# Patient Record
Sex: FEMALE | Race: BLACK OR AFRICAN AMERICAN | NOT HISPANIC OR LATINO
[De-identification: names, ages, dates, MRNs, and addresses within clinical notes are randomized per-mention and may not be internally consistent; named-entity substitution may affect disease eponyms.]

---

## 2021-01-01 ENCOUNTER — RESULT REVIEW (OUTPATIENT)
Age: 56
End: 2021-01-01

## 2021-05-20 ENCOUNTER — APPOINTMENT (OUTPATIENT)
Dept: SURGICAL ONCOLOGY | Facility: CLINIC | Age: 56
End: 2021-05-20
Payer: COMMERCIAL

## 2021-05-20 PROCEDURE — 19083 BX BREAST 1ST LESION US IMAG: CPT

## 2021-05-20 PROCEDURE — 99205 OFFICE O/P NEW HI 60 MIN: CPT | Mod: 25

## 2021-05-26 ENCOUNTER — NON-APPOINTMENT (OUTPATIENT)
Age: 56
End: 2021-05-26

## 2021-07-07 ENCOUNTER — NON-APPOINTMENT (OUTPATIENT)
Age: 56
End: 2021-07-07

## 2021-07-15 ENCOUNTER — APPOINTMENT (OUTPATIENT)
Dept: SURGICAL ONCOLOGY | Facility: CLINIC | Age: 56
End: 2021-07-15
Payer: COMMERCIAL

## 2021-07-15 PROCEDURE — 99215 OFFICE O/P EST HI 40 MIN: CPT

## 2021-07-27 ENCOUNTER — APPOINTMENT (OUTPATIENT)
Dept: SURGICAL ONCOLOGY | Facility: AMBULATORY MEDICAL SERVICES | Age: 56
End: 2021-07-27
Payer: COMMERCIAL

## 2021-07-27 PROCEDURE — 38792 RA TRACER ID OF SENTINL NODE: CPT | Mod: RT,59

## 2021-07-27 PROCEDURE — 19307 MAST MOD RAD: CPT | Mod: RT

## 2021-07-27 PROCEDURE — 38308 INCISION OF LYMPH CHANNELS: CPT | Mod: RT

## 2021-08-05 ENCOUNTER — APPOINTMENT (OUTPATIENT)
Dept: SURGICAL ONCOLOGY | Facility: CLINIC | Age: 56
End: 2021-08-05
Payer: COMMERCIAL

## 2021-08-05 PROCEDURE — 99024 POSTOP FOLLOW-UP VISIT: CPT

## 2021-11-11 ENCOUNTER — APPOINTMENT (OUTPATIENT)
Dept: SURGICAL ONCOLOGY | Facility: CLINIC | Age: 56
End: 2021-11-11
Payer: COMMERCIAL

## 2021-11-11 PROCEDURE — 99214 OFFICE O/P EST MOD 30 MIN: CPT

## 2022-02-17 ENCOUNTER — APPOINTMENT (OUTPATIENT)
Dept: SURGICAL ONCOLOGY | Facility: CLINIC | Age: 57
End: 2022-02-17
Payer: COMMERCIAL

## 2022-02-17 PROCEDURE — 99215 OFFICE O/P EST HI 40 MIN: CPT

## 2022-04-28 ENCOUNTER — APPOINTMENT (OUTPATIENT)
Dept: SURGICAL ONCOLOGY | Facility: CLINIC | Age: 57
End: 2022-04-28
Payer: MEDICAID

## 2022-04-28 PROCEDURE — 99215 OFFICE O/P EST HI 40 MIN: CPT

## 2022-07-28 ENCOUNTER — APPOINTMENT (OUTPATIENT)
Dept: SURGICAL ONCOLOGY | Facility: CLINIC | Age: 57
End: 2022-07-28

## 2023-03-30 ENCOUNTER — APPOINTMENT (OUTPATIENT)
Dept: SURGICAL ONCOLOGY | Facility: CLINIC | Age: 58
End: 2023-03-30
Payer: MEDICAID

## 2023-03-30 PROCEDURE — 99214 OFFICE O/P EST MOD 30 MIN: CPT

## 2023-10-05 ENCOUNTER — APPOINTMENT (OUTPATIENT)
Dept: SURGICAL ONCOLOGY | Facility: CLINIC | Age: 58
End: 2023-10-05
Payer: MEDICAID

## 2023-10-05 PROCEDURE — 99215 OFFICE O/P EST HI 40 MIN: CPT

## 2024-05-02 ENCOUNTER — RESULT REVIEW (OUTPATIENT)
Age: 59
End: 2024-05-02

## 2024-05-02 ENCOUNTER — APPOINTMENT (OUTPATIENT)
Dept: SURGICAL ONCOLOGY | Facility: CLINIC | Age: 59
End: 2024-05-02

## 2024-05-02 PROCEDURE — 99215 OFFICE O/P EST HI 40 MIN: CPT | Mod: 25

## 2024-05-02 PROCEDURE — 10005 FNA BX W/US GDN 1ST LES: CPT | Mod: RT

## 2024-05-08 ENCOUNTER — NON-APPOINTMENT (OUTPATIENT)
Age: 59
End: 2024-05-08

## 2024-05-08 DIAGNOSIS — D05.10 INTRADUCTAL CARCINOMA IN SITU OF UNSPECIFIED BREAST: ICD-10-CM

## 2024-07-11 ENCOUNTER — APPOINTMENT (OUTPATIENT)
Dept: SURGICAL ONCOLOGY | Facility: CLINIC | Age: 59
End: 2024-07-11

## 2024-07-11 PROCEDURE — 99215 OFFICE O/P EST HI 40 MIN: CPT

## 2024-07-12 DIAGNOSIS — C50.211 MALIGNANT NEOPLASM OF UPPER-INNER QUADRANT OF RIGHT FEMALE BREAST: ICD-10-CM

## 2024-07-12 DIAGNOSIS — Z17.0 MALIGNANT NEOPLASM OF UPPER-INNER QUADRANT OF RIGHT FEMALE BREAST: ICD-10-CM

## 2024-08-19 ENCOUNTER — APPOINTMENT (OUTPATIENT)
Dept: PLASTIC SURGERY | Facility: CLINIC | Age: 59
End: 2024-08-19
Payer: COMMERCIAL

## 2024-08-19 VITALS
SYSTOLIC BLOOD PRESSURE: 168 MMHG | HEART RATE: 91 BPM | OXYGEN SATURATION: 99 % | WEIGHT: 154 LBS | BODY MASS INDEX: 24.75 KG/M2 | DIASTOLIC BLOOD PRESSURE: 91 MMHG | TEMPERATURE: 97.9 F | HEIGHT: 66 IN

## 2024-08-19 PROCEDURE — 99204 OFFICE O/P NEW MOD 45 MIN: CPT

## 2024-08-19 NOTE — ASSESSMENT
[FreeTextEntry1] : Patient seen and evaluated with Dr. Oglesby. Patient is a 59 year old female in office for a consultation regarding excision of a right breast mass. Patient is referred to us by Dr. Barbosa. Patient is scheduled for surgery on 08/26/2024. Discussed procedure and closure with patient. Patient interested in further reconstruction after mass is removed. Discussed with patient possible liposuction and fat transfer to the breast, and well as placing a small implant in the breast. Will move forward with surgery on 08/26/2024 and have an ongoing discussion with patient about her future reconstruction goals.

## 2024-08-19 NOTE — PHYSICAL EXAM
[de-identified] : Right: Palpable non tender mass at the superior, medial aspect of the right breast, above the edge of the ELENO flap. ELENO flap incisions well healed.

## 2024-08-19 NOTE — PHYSICAL EXAM
[de-identified] : Right: Palpable non tender mass at the superior, medial aspect of the right breast, above the edge of the ELENO flap. ELENO flap incisions well healed.

## 2024-08-19 NOTE — HISTORY OF PRESENT ILLNESS
[FreeTextEntry1] : The patient is a 59 year old female here today for a consultation regarding excision of right breast mass. The patient was referred here today by Dr. Barbosa and is accompanied by her , Thomas. The patient is s/p right breast mastectomy (DOS: 7/27/2021). Patient underwent reconstructive surgery with Dr. Marion in 2021. Patient initially had a saline implant placed, implant site became infected and patient ended up getting a ELENO flap reconstruction with Dr. Marion in 2021. The patient denies any family history of breast cancer. Patient interested in further reconstruction of the right breast after mass has been removed. Patient feeling well, no concerns at this time. 
[FreeTextEntry1] : The patient is a 59 year old female here today for a consultation regarding excision of right breast mass. The patient was referred here today by Dr. Barbosa and is accompanied by her , Thomas. The patient is s/p right breast mastectomy (DOS: 7/27/2021). Patient underwent reconstructive surgery with Dr. Marion in 2021. Patient initially had a saline implant placed, implant site became infected and patient ended up getting a ELENO flap reconstruction with Dr. Marion in 2021. The patient denies any family history of breast cancer. Patient interested in further reconstruction of the right breast after mass has been removed. Patient feeling well, no concerns at this time. 
Detail Level: Simple
Comment: Atopic dermatitis, moderate- evidence of secondary bacterial infection

## 2024-08-26 ENCOUNTER — APPOINTMENT (OUTPATIENT)
Dept: SURGICAL ONCOLOGY | Facility: HOSPITAL | Age: 59
End: 2024-08-26

## 2024-08-26 ENCOUNTER — RESULT REVIEW (OUTPATIENT)
Age: 59
End: 2024-08-26

## 2024-08-26 ENCOUNTER — APPOINTMENT (OUTPATIENT)
Dept: PLASTIC SURGERY | Facility: HOSPITAL | Age: 59
End: 2024-08-26

## 2024-08-26 ENCOUNTER — TRANSCRIPTION ENCOUNTER (OUTPATIENT)
Age: 59
End: 2024-08-26

## 2024-08-26 ENCOUNTER — APPOINTMENT (OUTPATIENT)
Dept: PLASTIC SURGERY | Facility: HOSPITAL | Age: 59
End: 2024-08-26
Payer: COMMERCIAL

## 2024-08-26 PROCEDURE — 15002 WOUND PREP TRK/ARM/LEG: CPT

## 2024-08-26 PROCEDURE — 14301 TIS TRNFR ANY 30.1-60 SQ CM: CPT

## 2024-08-26 PROCEDURE — 14302 TIS TRNFR ADDL 30 SQ CM: CPT

## 2024-09-06 ENCOUNTER — APPOINTMENT (OUTPATIENT)
Dept: PLASTIC SURGERY | Facility: CLINIC | Age: 59
End: 2024-09-06
Payer: COMMERCIAL

## 2024-09-06 VITALS
RESPIRATION RATE: 16 BRPM | SYSTOLIC BLOOD PRESSURE: 157 MMHG | WEIGHT: 154 LBS | HEART RATE: 56 BPM | BODY MASS INDEX: 24.75 KG/M2 | HEIGHT: 66 IN | TEMPERATURE: 97.7 F | OXYGEN SATURATION: 99 % | DIASTOLIC BLOOD PRESSURE: 77 MMHG

## 2024-09-06 PROCEDURE — 99024 POSTOP FOLLOW-UP VISIT: CPT

## 2024-09-09 NOTE — PHYSICAL EXAM
[de-identified] : Right breast incision healing well, sutures intact. No wound breakdown, dehiscence, erythema, or signs of infection.

## 2024-09-09 NOTE — PHYSICAL EXAM
[de-identified] : Right breast incision healing well, sutures intact. No wound breakdown, dehiscence, erythema, or signs of infection.

## 2024-09-09 NOTE — HISTORY OF PRESENT ILLNESS
[FreeTextEntry1] : The patient is a 59-year-old female here today for a post-op visit s/p reconstruction of right breast wound (DOS: 08/26/24). Patient states she is feeling great in office today. Patient is very happy with the results of surgery, and once patient is more healed she would like to have further conversations about her reconstructive options. Patient has no complaints in office today. She denies any bleeding, draining, itching, fever or chills.

## 2024-09-11 ENCOUNTER — APPOINTMENT (OUTPATIENT)
Dept: PLASTIC SURGERY | Facility: CLINIC | Age: 59
End: 2024-09-11
Payer: COMMERCIAL

## 2024-09-11 PROCEDURE — 99024 POSTOP FOLLOW-UP VISIT: CPT

## 2024-09-12 ENCOUNTER — APPOINTMENT (OUTPATIENT)
Dept: SURGICAL ONCOLOGY | Facility: CLINIC | Age: 59
End: 2024-09-12

## 2024-09-12 PROCEDURE — 99024 POSTOP FOLLOW-UP VISIT: CPT

## 2024-09-12 NOTE — HISTORY OF PRESENT ILLNESS
[FreeTextEntry1] : The patient is a 59-year-old female here today for a post-op visit and suture removal s/p reconstruction of right breast wound (DOS: 08/26/24). Patient states she is feeling great. Patient has no complaints or concerns in office today. She denies any bleeding, draining, itching, fever or chills.

## 2024-09-12 NOTE — PHYSICAL EXAM
[de-identified] : Right breast incision healing well, sutures removed in office today. No wound breakdown, dehiscence, erythema, or signs of infection.

## 2024-09-17 ENCOUNTER — APPOINTMENT (OUTPATIENT)
Dept: PLASTIC SURGERY | Facility: CLINIC | Age: 59
End: 2024-09-17
Payer: COMMERCIAL

## 2024-09-17 VITALS
DIASTOLIC BLOOD PRESSURE: 68 MMHG | WEIGHT: 154 LBS | HEART RATE: 58 BPM | TEMPERATURE: 98.2 F | SYSTOLIC BLOOD PRESSURE: 126 MMHG | BODY MASS INDEX: 24.75 KG/M2 | HEIGHT: 66 IN | OXYGEN SATURATION: 99 %

## 2024-09-17 DIAGNOSIS — C50.211 MALIGNANT NEOPLASM OF UPPER-INNER QUADRANT OF RIGHT FEMALE BREAST: ICD-10-CM

## 2024-09-17 DIAGNOSIS — Z17.0 MALIGNANT NEOPLASM OF UPPER-INNER QUADRANT OF RIGHT FEMALE BREAST: ICD-10-CM

## 2024-09-17 PROCEDURE — 99024 POSTOP FOLLOW-UP VISIT: CPT

## 2024-09-17 RX ORDER — SULFAMETHOXAZOLE AND TRIMETHOPRIM 800; 160 MG/1; MG/1
800-160 TABLET ORAL TWICE DAILY
Qty: 20 | Refills: 0 | Status: ACTIVE | COMMUNITY
Start: 2024-09-17 | End: 1900-01-01

## 2024-09-17 NOTE — PHYSICAL EXAM
[de-identified] : Right breast flap healing well, breast incision with small pinpoint wound with murky purulent drainage, >50cc drainage expressed from right breast, no surrounding erythema noted, nontender, wound dressed with bacitracin and covered in gauze/abd pad

## 2024-09-17 NOTE — HISTORY OF PRESENT ILLNESS
[FreeTextEntry1] : The patient is a 59-year-old female here today for a post-op visit and suture removal s/p reconstruction of right breast wound (DOS: 08/26/24). Patient accompanied by family member. Pt reported white drainage from right breast wound since yesterday. No further complaints at this time. Denies fever, chills, pain, rash.

## 2024-09-26 ENCOUNTER — APPOINTMENT (OUTPATIENT)
Dept: PLASTIC SURGERY | Facility: CLINIC | Age: 59
End: 2024-09-26
Payer: COMMERCIAL

## 2024-09-26 PROCEDURE — 99024 POSTOP FOLLOW-UP VISIT: CPT

## 2024-09-26 NOTE — REASON FOR VISIT
[Post Op: _________] : a [unfilled] post op visit [FreeTextEntry1] :  s/p reconstruction of right breast wound (DOS: 08/26/24). Pt has no complaints today. Pt denies any fevers, chill, discharge, itchiness, drainage, or pain.

## 2024-09-27 NOTE — PHYSICAL EXAM
[de-identified] : Right breast incision healing well. No evidence of wound breakdown, drainage, or signs of infection

## 2024-09-27 NOTE — PHYSICAL EXAM
[de-identified] : Right breast incision healing well. No evidence of wound breakdown, drainage, or signs of infection

## 2024-09-27 NOTE — HISTORY OF PRESENT ILLNESS
[FreeTextEntry1] : The patient is a 59 year old female in office for a post op visit s/p reconstruction of right breast wound (DOS: 08/26/24). Patient is accompanied by a family member to today's visit. Patient states she is no longer experiencing any drainage. Patient states she is feeling very well and has no concerns or complaints in office today. Patient denies fever, chills, redness, or drainage.